# Patient Record
Sex: FEMALE | Race: WHITE | NOT HISPANIC OR LATINO | URBAN - METROPOLITAN AREA
[De-identification: names, ages, dates, MRNs, and addresses within clinical notes are randomized per-mention and may not be internally consistent; named-entity substitution may affect disease eponyms.]

---

## 2017-12-06 PROBLEM — 34000006 CROHN'S DISEASE: Status: ACTIVE | Noted: 2017-12-06

## 2017-12-06 PROBLEM — 13645005 CHRONIC OBSTRUCTIVE PULMONARY DISEASE: Status: ACTIVE | Noted: 2017-12-06

## 2017-12-06 PROBLEM — 13644009 HYPERCHOLESTEROLEMIA: Status: ACTIVE | Noted: 2017-12-06

## 2018-05-04 PROBLEM — 428283002 HISTORY OF POLYP OF COLON: Status: ACTIVE | Noted: 2018-05-04

## 2018-05-04 PROBLEM — 70153002 HEMORRHOIDS: Status: ACTIVE | Noted: 2018-05-04

## 2019-08-02 PROBLEM — 64226004 COLITIS: Status: ACTIVE | Noted: 2019-08-02

## 2019-08-02 PROBLEM — 10743008 IRRITABLE BOWEL SYNDROME: Status: ACTIVE | Noted: 2019-08-02

## 2019-11-08 PROBLEM — 197321007 FATTY LIVER: Status: ACTIVE | Noted: 2019-11-08

## 2019-11-08 PROBLEM — 91175000 SEIZURE: Status: ACTIVE | Noted: 2019-11-08

## 2021-03-16 ENCOUNTER — OFFICE VISIT (OUTPATIENT)
Dept: URBAN - METROPOLITAN AREA TELEHEALTH 2 | Facility: TELEHEALTH | Age: 76
End: 2021-03-16

## 2021-03-16 PROBLEM — 3723001 ARTHRITIS: Status: ACTIVE | Noted: 2021-03-16

## 2021-03-16 PROBLEM — 235595009 GASTROESOPHAGEAL REFLUX DISEASE: Status: ACTIVE | Noted: 2021-03-16

## 2021-03-16 PROBLEM — 84089009 HIATAL HERNIA: Status: ACTIVE | Noted: 2021-03-16

## 2021-03-16 PROBLEM — 64859006 OSTEOPOROSIS: Status: ACTIVE | Noted: 2021-03-16

## 2021-03-16 PROBLEM — 398050005 DIVERTICULAR DISEASE OF COLON: Status: ACTIVE | Noted: 2021-03-16

## 2021-08-03 ENCOUNTER — OFFICE VISIT (OUTPATIENT)
Dept: URBAN - METROPOLITAN AREA CLINIC 13 | Facility: CLINIC | Age: 76
End: 2021-08-03

## 2021-08-03 ENCOUNTER — OFFICE VISIT (OUTPATIENT)
Dept: URBAN - METROPOLITAN AREA TELEHEALTH 2 | Facility: TELEHEALTH | Age: 76
End: 2021-08-03

## 2021-08-28 ENCOUNTER — TELEPHONE ENCOUNTER (OUTPATIENT)
Dept: URBAN - METROPOLITAN AREA CLINIC 13 | Facility: CLINIC | Age: 76
End: 2021-08-28

## 2021-08-28 RX ORDER — FENOFIBRATE 134 MG/1
CAPSULE ORAL
OUTPATIENT
End: 2019-11-08

## 2021-08-28 RX ORDER — OMEPRAZOLE 10 MG/1
CAPSULE, DELAYED RELEASE ORAL
OUTPATIENT
Start: 2017-12-07 | End: 2021-02-15

## 2021-08-28 RX ORDER — PANTOPRAZOLE SODIUM 40 MG/1
TABLET, DELAYED RELEASE ORAL
OUTPATIENT
Start: 2020-04-13 | End: 2021-02-15

## 2021-08-28 RX ORDER — ESOMEPRAZOLE MAGNESIUM 20 MG/1
FOR SUSPENSION ORAL
OUTPATIENT
Start: 2020-03-31 | End: 2021-06-09

## 2021-08-28 RX ORDER — OMEPRAZOLE 10 MG/1
CAPSULE, DELAYED RELEASE ORAL
OUTPATIENT
End: 2018-05-04

## 2021-08-28 RX ORDER — MERCAPTOPURINE 50 MG/1
TABLET ORAL
OUTPATIENT
End: 2018-05-04

## 2021-08-29 ENCOUNTER — TELEPHONE ENCOUNTER (OUTPATIENT)
Dept: URBAN - METROPOLITAN AREA CLINIC 13 | Facility: CLINIC | Age: 76
End: 2021-08-29

## 2021-08-29 RX ORDER — OMEPRAZOLE 10 MG/1
CAPSULE, DELAYED RELEASE ORAL
Status: ACTIVE | COMMUNITY

## 2021-08-29 RX ORDER — DICYCLOMINE HYDROCHLORIDE 10 MG/1
CAPSULE ORAL
Status: ACTIVE | COMMUNITY
Start: 2020-01-29

## 2021-08-29 RX ORDER — MERCAPTOPURINE 50 MG/1
TABLET ORAL
Status: ACTIVE | COMMUNITY
Start: 2018-02-07

## 2021-08-29 RX ORDER — CYANOCOBALAMIN 1000 UG/ML
INJECTION INTRAMUSCULAR; SUBCUTANEOUS
Status: ACTIVE | COMMUNITY

## 2021-08-29 RX ORDER — PRIMIDONE 50 MG/1
TABLET ORAL
Status: ACTIVE | COMMUNITY

## 2021-08-29 RX ORDER — ESOMEPRAZOLE MAGNESIUM 20 MG/1
FOR SUSPENSION ORAL
Status: ACTIVE | COMMUNITY

## 2022-01-11 ENCOUNTER — ERX REFILL RESPONSE (OUTPATIENT)
Dept: URBAN - METROPOLITAN AREA CLINIC 44 | Facility: CLINIC | Age: 77
End: 2022-01-11

## 2022-01-11 RX ORDER — MERCAPTOPURINE 50 MG/1
TABLET ORAL
OUTPATIENT

## 2022-01-11 RX ORDER — MERCAPTOPURINE 50 MG/1
TAKE 1 TABLET BY MOUTH EVERY DAY TABLET ORAL
Qty: 90 TABLET | Refills: 4 | OUTPATIENT

## 2022-02-09 ENCOUNTER — TELEPHONE ENCOUNTER (OUTPATIENT)
Dept: URBAN - METROPOLITAN AREA CLINIC 46 | Facility: CLINIC | Age: 77
End: 2022-02-09

## 2022-02-09 ENCOUNTER — OFFICE VISIT (OUTPATIENT)
Dept: URBAN - METROPOLITAN AREA CLINIC 48 | Facility: CLINIC | Age: 77
End: 2022-02-09

## 2022-02-09 RX ORDER — ADALIMUMAB 40MG/0.8ML
0.8 ML KIT SUBCUTANEOUS EVERY 2 WEEKS
Qty: 2 PEN NEEDLE | Refills: 11 | OUTPATIENT
Start: 2022-02-09 | End: 2023-02-04

## 2022-03-22 ENCOUNTER — OFFICE VISIT (OUTPATIENT)
Dept: URBAN - METROPOLITAN AREA CLINIC 44 | Facility: CLINIC | Age: 77
End: 2022-03-22

## 2022-03-22 RX ORDER — OMEPRAZOLE 10 MG/1
CAPSULE, DELAYED RELEASE ORAL
Status: DISCONTINUED | COMMUNITY

## 2022-03-22 RX ORDER — METFORMIN HYDROCHLORIDE 750 MG/1
1 TABLET WITH EVENING MEAL TABLET, EXTENDED RELEASE ORAL ONCE A DAY
Status: ACTIVE | COMMUNITY

## 2022-03-22 RX ORDER — ADALIMUMAB 40MG/0.4ML
1 SUBQ  Q 2 WEEKS KIT SUBCUTANEOUS
Status: ACTIVE | COMMUNITY

## 2022-03-22 RX ORDER — ADALIMUMAB 40MG/0.8ML
0.8 ML KIT SUBCUTANEOUS EVERY 2 WEEKS
Qty: 2 PEN NEEDLE | Refills: 11 | Status: DISCONTINUED | COMMUNITY
Start: 2022-02-09 | End: 2023-02-04

## 2022-03-22 RX ORDER — OMEPRAZOLE 20 MG/1
1 CAPSULE 30 MINUTES BEFORE MORNING MEAL CAPSULE, DELAYED RELEASE ORAL ONCE A DAY
Status: ACTIVE | COMMUNITY

## 2022-03-22 RX ORDER — CYANOCOBALAMIN 1000 UG/ML
INJECTION INTRAMUSCULAR; SUBCUTANEOUS
Status: DISCONTINUED | COMMUNITY

## 2022-03-22 RX ORDER — PRIMIDONE 50 MG/1
1 TABLET TABLET ORAL ONCE A DAY
Status: ACTIVE | COMMUNITY

## 2022-03-22 RX ORDER — MERCAPTOPURINE 50 MG/1
TAKE 1 TABLET BY MOUTH EVERY DAY TABLET ORAL
Qty: 90 TABLET | Refills: 4 | Status: DISCONTINUED | COMMUNITY

## 2022-03-22 RX ORDER — ESOMEPRAZOLE MAGNESIUM 20 MG/1
1 CAPSULE CAPSULE, DELAYED RELEASE ORAL ONCE A DAY
Status: ACTIVE | COMMUNITY

## 2022-03-22 RX ORDER — GLIMEPIRIDE 1 MG/1
1 TABLET WITH BREAKFAST OR THE FIRST MAIN MEAL OF THE DAY TABLET ORAL ONCE A DAY
Status: ACTIVE | COMMUNITY

## 2022-03-22 RX ORDER — DICYCLOMINE HYDROCHLORIDE 10 MG/1
CAPSULE ORAL
Status: DISCONTINUED | COMMUNITY
Start: 2020-01-29

## 2022-03-22 RX ORDER — PRIMIDONE 50 MG/1
TABLET ORAL
Status: DISCONTINUED | COMMUNITY

## 2022-03-22 RX ORDER — ESOMEPRAZOLE MAGNESIUM 20 MG/1
FOR SUSPENSION ORAL
Status: DISCONTINUED | COMMUNITY

## 2022-04-04 ENCOUNTER — ERX REFILL RESPONSE (OUTPATIENT)
Dept: URBAN - METROPOLITAN AREA CLINIC 44 | Facility: CLINIC | Age: 77
End: 2022-04-04

## 2022-04-04 ENCOUNTER — OFFICE VISIT (OUTPATIENT)
Dept: URBAN - METROPOLITAN AREA CLINIC 44 | Facility: CLINIC | Age: 77
End: 2022-04-04

## 2022-04-04 RX ORDER — ESOMEPRAZOLE MAGNESIUM 20 MG/1
TAKE 1 CAPSULE BY MOUTH EVERY DAY AS DIRECTED CAPSULE, DELAYED RELEASE ORAL
Qty: 90 CAPSULE | Refills: 4 | OUTPATIENT

## 2022-05-03 ENCOUNTER — OFFICE VISIT (OUTPATIENT)
Dept: URBAN - METROPOLITAN AREA CLINIC 44 | Facility: CLINIC | Age: 77
End: 2022-05-03
Payer: COMMERCIAL

## 2022-05-03 VITALS
HEIGHT: 63 IN | TEMPERATURE: 97.7 F | HEART RATE: 64 BPM | SYSTOLIC BLOOD PRESSURE: 132 MMHG | WEIGHT: 158 LBS | DIASTOLIC BLOOD PRESSURE: 64 MMHG | BODY MASS INDEX: 28 KG/M2

## 2022-05-03 DIAGNOSIS — D50.8 OTHER IRON DEFICIENCY ANEMIA: ICD-10-CM

## 2022-05-03 DIAGNOSIS — K21.9 GASTROESOPHAGEAL REFLUX DISEASE WITHOUT ESOPHAGITIS: ICD-10-CM

## 2022-05-03 DIAGNOSIS — K51.80 OTHER ULCERATIVE COLITIS: ICD-10-CM

## 2022-05-03 PROBLEM — 266435005: Status: ACTIVE | Noted: 2022-05-03

## 2022-05-03 PROCEDURE — 99214 OFFICE O/P EST MOD 30 MIN: CPT | Performed by: INTERNAL MEDICINE

## 2022-05-03 RX ORDER — ADALIMUMAB 40MG/0.4ML
1 SUBQ  Q 2 WEEKS KIT SUBCUTANEOUS
OUTPATIENT

## 2022-05-03 RX ORDER — ADALIMUMAB 40MG/0.4ML
1 SUBQ  Q 2 WEEKS KIT SUBCUTANEOUS
Status: ACTIVE | COMMUNITY

## 2022-05-03 RX ORDER — AZATHIOPRINE 50 MG
AS DIRECTED TABLET ORAL ONCE A DAY
OUTPATIENT

## 2022-05-03 RX ORDER — PRIMIDONE 50 MG/1
1 TABLET TABLET ORAL ONCE A DAY
Status: ACTIVE | COMMUNITY

## 2022-05-03 RX ORDER — ESOMEPRAZOLE MAGNESIUM 20 MG/1
TAKE 1 CAPSULE BY MOUTH EVERY DAY AS DIRECTED CAPSULE, DELAYED RELEASE ORAL
OUTPATIENT

## 2022-05-03 RX ORDER — ESOMEPRAZOLE MAGNESIUM 20 MG/1
TAKE 1 CAPSULE BY MOUTH EVERY DAY AS DIRECTED CAPSULE, DELAYED RELEASE ORAL
Qty: 90 CAPSULE | Refills: 4 | Status: ACTIVE | COMMUNITY

## 2022-05-03 RX ORDER — GLIMEPIRIDE 1 MG/1
1 TABLET WITH BREAKFAST OR THE FIRST MAIN MEAL OF THE DAY TABLET ORAL ONCE A DAY
Status: ACTIVE | COMMUNITY

## 2022-05-03 RX ORDER — DAPAGLIFLOZIN 5 MG/1
1 TABLET TABLET, FILM COATED ORAL ONCE A DAY
Status: ACTIVE | COMMUNITY

## 2022-05-03 RX ORDER — AZATHIOPRINE 50 MG
AS DIRECTED TABLET ORAL ONCE A DAY
Status: ACTIVE | COMMUNITY

## 2022-05-03 NOTE — HPI-TODAY'S VISIT:
Pt with a hx of moderate to severe Ulcerative colitis started on Remicade in 2014 and did not achieve full clinical remission despite adding immunomodulators. Was on 5mg/kg every 8 weeks and changed to every 6 weeks in mid 2014 due to persistent diarrhea. Remicade changed to Humira 2015 after restaging colonoscopy revealed persistent colitis and adequate serum levels of drug. 6MP restarted with Apriso and still with symptoms; re-staging colonoscopy 10/2015 revealed active left sided colitis and microscpic disease in right colon. Pt treated with prednisone and later achieved clinical remission. Has needed IV irion in the past.  Last seen 3/2021 and stable on Humira 40mg q 2 weeks; 50mg of 6MP, Apriso 0.75 gm per day. Anser testing and no Ab's and CBC and CMP wnl. Pt now presents for a follow up. Missed her last visit in 9/2021. Was hospitalized 1/2022 for acute cholecystitis and CMP and CBC wnl at that time; repeat CT 3 weeks later and post surgical changes and no colitis.  Now states doing well and no loose stools or bleeding. Still on Humira 40mg q2 weeks and 50mg of 6MP; off Apriso

## 2022-05-27 ENCOUNTER — OFFICE VISIT (OUTPATIENT)
Dept: URBAN - METROPOLITAN AREA SURGERY CENTER 27 | Facility: SURGERY CENTER | Age: 77
End: 2022-05-27

## 2022-06-13 PROBLEM — 442159003 CHRONIC ULCERATIVE PANCOLITIS: Status: ACTIVE | Noted: 2022-05-13

## 2022-06-30 ENCOUNTER — OFFICE VISIT (OUTPATIENT)
Dept: URBAN - METROPOLITAN AREA SURGERY CENTER 27 | Facility: SURGERY CENTER | Age: 77
End: 2022-06-30
Payer: COMMERCIAL

## 2022-06-30 ENCOUNTER — CLAIMS CREATED FROM THE CLAIM WINDOW (OUTPATIENT)
Dept: URBAN - METROPOLITAN AREA CLINIC 4 | Facility: CLINIC | Age: 77
End: 2022-06-30
Payer: COMMERCIAL

## 2022-06-30 DIAGNOSIS — K57.30 ACQUIRED DIVERTICULOSIS OF COLON: ICD-10-CM

## 2022-06-30 DIAGNOSIS — K51.80 OTHER ULCERATIVE COLITIS WITHOUT COMPLICATIONS: ICD-10-CM

## 2022-06-30 DIAGNOSIS — K51.80 CHRONIC PANCOLONIC ULCERATIVE COLITIS: ICD-10-CM

## 2022-06-30 DIAGNOSIS — K64.1 BLEEDING GRADE II HEMORRHOIDS: ICD-10-CM

## 2022-06-30 DIAGNOSIS — K63.89 OTHER SPECIFIED DISEASES OF INTESTINE: ICD-10-CM

## 2022-06-30 DIAGNOSIS — K63.89 BACTERIAL OVERGROWTH SYNDROME: ICD-10-CM

## 2022-06-30 PROCEDURE — 45380 COLONOSCOPY AND BIOPSY: CPT | Performed by: INTERNAL MEDICINE

## 2022-06-30 PROCEDURE — 88305 TISSUE EXAM BY PATHOLOGIST: CPT | Performed by: PATHOLOGY

## 2022-06-30 PROCEDURE — G8907 PT DOC NO EVENTS ON DISCHARG: HCPCS | Performed by: INTERNAL MEDICINE

## 2022-06-30 RX ORDER — GLIMEPIRIDE 1 MG/1
1 TABLET WITH BREAKFAST OR THE FIRST MAIN MEAL OF THE DAY TABLET ORAL ONCE A DAY
Status: ACTIVE | COMMUNITY

## 2022-06-30 RX ORDER — DAPAGLIFLOZIN 5 MG/1
1 TABLET TABLET, FILM COATED ORAL ONCE A DAY
Status: ACTIVE | COMMUNITY

## 2022-06-30 RX ORDER — ESOMEPRAZOLE MAGNESIUM 20 MG/1
TAKE 1 CAPSULE BY MOUTH EVERY DAY AS DIRECTED CAPSULE, DELAYED RELEASE ORAL
Status: ACTIVE | COMMUNITY

## 2022-06-30 RX ORDER — ADALIMUMAB 40MG/0.4ML
1 SUBQ  Q 2 WEEKS KIT SUBCUTANEOUS
Status: ACTIVE | COMMUNITY

## 2022-06-30 RX ORDER — AZATHIOPRINE 50 MG
AS DIRECTED TABLET ORAL ONCE A DAY
Status: ACTIVE | COMMUNITY

## 2022-06-30 RX ORDER — PRIMIDONE 50 MG/1
1 TABLET TABLET ORAL ONCE A DAY
Status: ACTIVE | COMMUNITY

## 2022-08-02 ENCOUNTER — ERX REFILL RESPONSE (OUTPATIENT)
Dept: URBAN - METROPOLITAN AREA CLINIC 44 | Facility: CLINIC | Age: 77
End: 2022-08-02

## 2022-08-02 RX ORDER — ADALIMUMAB 40MG/0.4ML
INJECT 40MG UNDER THE SKIN EVERY 2 WEEKS KIT SUBCUTANEOUS
Qty: 2 EACH | Refills: 10 | OUTPATIENT

## 2022-08-02 RX ORDER — ADALIMUMAB 40MG/0.4ML
1 SUBQ  Q 2 WEEKS KIT SUBCUTANEOUS
OUTPATIENT

## 2023-03-13 ENCOUNTER — P2P PATIENT RECORD (OUTPATIENT)
Age: 78
End: 2023-03-13

## 2023-03-21 ENCOUNTER — OFFICE VISIT (OUTPATIENT)
Dept: URBAN - METROPOLITAN AREA CLINIC 48 | Facility: CLINIC | Age: 78
End: 2023-03-21
Payer: COMMERCIAL

## 2023-03-21 VITALS — BODY MASS INDEX: 30.83 KG/M2 | WEIGHT: 174 LBS | HEIGHT: 63 IN

## 2023-03-21 DIAGNOSIS — K21.9 GASTROESOPHAGEAL REFLUX DISEASE WITHOUT ESOPHAGITIS: ICD-10-CM

## 2023-03-21 DIAGNOSIS — K51.00 ULCERATIVE PAN COLITIS: ICD-10-CM

## 2023-03-21 DIAGNOSIS — K57.30 DVRTCLOS OF LG INT W/O PERFORATION OR ABSCESS W/O BLEEDING: ICD-10-CM

## 2023-03-21 DIAGNOSIS — D50.0 ANEMIA, IRON DEFICIENCY FROM CHRONIC BLOOD LOSS: ICD-10-CM

## 2023-03-21 PROBLEM — 398050005 DIVERTICULAR DISEASE OF COLON: Status: ACTIVE | Noted: 2023-03-21

## 2023-03-21 PROBLEM — 413533008 ANEMIA DUE TO CHRONIC BLOOD LOSS: Status: ACTIVE | Noted: 2023-03-21

## 2023-03-21 PROCEDURE — 99213 OFFICE O/P EST LOW 20 MIN: CPT | Performed by: NURSE PRACTITIONER

## 2023-03-21 RX ORDER — ADALIMUMAB 40MG/0.4ML
INJECT 40MG UNDER THE SKIN EVERY 2 WEEKS KIT SUBCUTANEOUS
Qty: 2 EACH | Refills: 10 | Status: ACTIVE | COMMUNITY

## 2023-03-21 RX ORDER — MERCAPTOPURINE 50 MG/1
AS DIRECTED TABLET ORAL ONCE A DAY
Status: ACTIVE | COMMUNITY

## 2023-03-21 RX ORDER — ESOMEPRAZOLE MAGNESIUM 20 MG/1
TAKE 1 CAPSULE BY MOUTH EVERY DAY AS DIRECTED CAPSULE, DELAYED RELEASE ORAL
Status: ACTIVE | COMMUNITY

## 2023-03-21 NOTE — HPI-TODAY'S VISIT:
Pt with a hx of moderate to severe Ulcerative colitis started on Remicade in 2014 and did not achieve full clinical remission despite adding immunomodulators. Was on 5mg/kg every 8 weeks and changed to every 6 weeks in mid 2014 due to persistent diarrhea. Remicade changed to Humira 2015 after restaging colonoscopy revealed persistent colitis and adequate serum levels of drug. 6MP restarted with Apriso and still with symptoms; re-staging colonoscopy 10/2015 revealed active left sided colitis and microscpic disease in right colon. Pt treated with prednisone and later achieved clinical remission. Has needed IV irion in the past.   Was hospitalized 1/2022 for acute cholecystitis and CMP and CBC wnl at that time; repeat CT 3 weeks later and post surgical changes and no colitis.  Colon 6/2022 showed diverticulosis, no active colitis but scars from previous inflammation, internal hemorrhoids and a normal TI. Path with left colon and rectal patchy mild colitis. She has been stable on Humira 40mg q 2 weeks; 50mg of 6MP. Anser testing and no Ab's and CBC and CMP wnl. Denies abdominal pain and bowel movements are 1-2 times daily of formed, non-bloody stool. GERD is well controlled on Esomepraozle.

## 2023-05-09 ENCOUNTER — TELEPHONE ENCOUNTER (OUTPATIENT)
Dept: URBAN - METROPOLITAN AREA CLINIC 46 | Facility: CLINIC | Age: 78
End: 2023-05-09

## 2023-05-09 RX ORDER — MERCAPTOPURINE 50 MG/1
1 CAPSULE TABLET ORAL ONCE A DAY
Qty: 30 CAPSULE | Refills: 5

## 2023-05-09 RX ORDER — ESOMEPRAZOLE MAGNESIUM 20 MG/1
TAKE 1 CAPSULE BY MOUTH EVERY DAY AS DIRECTED CAPSULE, DELAYED RELEASE ORAL ONCE A DAY
Qty: 30 | Refills: 5

## 2023-06-29 ENCOUNTER — P2P PATIENT RECORD (OUTPATIENT)
Age: 78
End: 2023-06-29

## 2023-07-14 ENCOUNTER — ERX REFILL RESPONSE (OUTPATIENT)
Dept: URBAN - METROPOLITAN AREA CLINIC 44 | Facility: CLINIC | Age: 78
End: 2023-07-14

## 2023-07-14 RX ORDER — ADALIMUMAB 40MG/0.4ML
INJECT 40MG UNDER THE SKIN EVERY 2 WEEKS KIT SUBCUTANEOUS
Qty: 2 | Refills: 8 | OUTPATIENT

## 2023-07-14 RX ORDER — ADALIMUMAB 40MG/0.4ML
INJECT 40MG UNDER THE SKIN EVERY 2 WEEKS KIT SUBCUTANEOUS
Qty: 2 | Refills: 9 | OUTPATIENT

## 2023-09-07 ENCOUNTER — ERX REFILL RESPONSE (OUTPATIENT)
Dept: URBAN - METROPOLITAN AREA CLINIC 46 | Facility: CLINIC | Age: 78
End: 2023-09-07

## 2023-09-07 RX ORDER — ESOMEPRAZOLE MAGNESIUM 20 MG/1
TAKE 1 CAPSULE BY MOUTH DAILY CAPSULE, DELAYED RELEASE ORAL
Qty: 60 CAPSULE | Refills: 5 | OUTPATIENT

## 2023-09-07 RX ORDER — ESOMEPRAZOLE MAGNESIUM 20 MG/1
TAKE 1 CAPSULE BY MOUTH EVERY DAY AS DIRECTED CAPSULE, DELAYED RELEASE ORAL ONCE A DAY
Qty: 30 | Refills: 5 | OUTPATIENT

## 2024-05-02 ENCOUNTER — OFFICE VISIT (OUTPATIENT)
Dept: URBAN - METROPOLITAN AREA CLINIC 25 | Facility: CLINIC | Age: 79
End: 2024-05-02

## 2024-05-07 ENCOUNTER — ERX REFILL RESPONSE (OUTPATIENT)
Dept: URBAN - METROPOLITAN AREA CLINIC 44 | Facility: CLINIC | Age: 79
End: 2024-05-07

## 2024-05-07 RX ORDER — ADALIMUMAB 40MG/0.4ML
INJECT 40MG UNDER THE SKIN EVERY 2 WEEKS KIT SUBCUTANEOUS
Qty: 2 | Refills: 8 | OUTPATIENT

## 2024-05-07 RX ORDER — ADALIMUMAB 40MG/0.4ML
INJECT 1 PEN UNDER THE SKIN EVERY 14 DAYS KIT SUBCUTANEOUS
Qty: 2 | Refills: 0 | OUTPATIENT

## 2024-06-05 ENCOUNTER — ERX REFILL RESPONSE (OUTPATIENT)
Dept: URBAN - METROPOLITAN AREA CLINIC 44 | Facility: CLINIC | Age: 79
End: 2024-06-05

## 2024-06-05 RX ORDER — ADALIMUMAB 40MG/0.4ML
INJECT 1 PEN UNDER THE SKIN EVERY 14 DAYS KIT SUBCUTANEOUS
Qty: 2 | Refills: 0 | OUTPATIENT

## 2024-06-21 ENCOUNTER — TELEPHONE ENCOUNTER (OUTPATIENT)
Dept: URBAN - METROPOLITAN AREA CLINIC 46 | Facility: CLINIC | Age: 79
End: 2024-06-21

## 2024-06-21 RX ORDER — ESOMEPRAZOLE MAGNESIUM 20 MG/1
1 CAPSULE CAPSULE, DELAYED RELEASE ORAL ONCE A DAY
Qty: 30 | Refills: 2 | OUTPATIENT

## 2024-07-16 ENCOUNTER — DASHBOARD ENCOUNTERS (OUTPATIENT)
Age: 79
End: 2024-07-16

## 2024-07-16 ENCOUNTER — ERX REFILL RESPONSE (OUTPATIENT)
Dept: URBAN - METROPOLITAN AREA CLINIC 44 | Facility: CLINIC | Age: 79
End: 2024-07-16

## 2024-07-16 RX ORDER — ADALIMUMAB 40MG/0.4ML
INJECT 1 PEN UNDER THE SKIN EVERY 14 DAYS KIT SUBCUTANEOUS
Qty: 2 | Refills: 0 | OUTPATIENT

## 2024-07-18 ENCOUNTER — OFFICE VISIT (OUTPATIENT)
Dept: URBAN - METROPOLITAN AREA CLINIC 48 | Facility: CLINIC | Age: 79
End: 2024-07-18
Payer: COMMERCIAL

## 2024-07-18 VITALS
TEMPERATURE: 97.5 F | DIASTOLIC BLOOD PRESSURE: 76 MMHG | HEART RATE: 77 BPM | SYSTOLIC BLOOD PRESSURE: 113 MMHG | BODY MASS INDEX: 25.55 KG/M2 | WEIGHT: 144.2 LBS | HEIGHT: 63 IN | OXYGEN SATURATION: 95 %

## 2024-07-18 DIAGNOSIS — K51.00 ULCERATIVE PAN COLITIS: ICD-10-CM

## 2024-07-18 DIAGNOSIS — K21.9 GASTROESOPHAGEAL REFLUX DISEASE WITHOUT ESOPHAGITIS: ICD-10-CM

## 2024-07-18 DIAGNOSIS — R63.4 WEIGHT LOSS: ICD-10-CM

## 2024-07-18 PROCEDURE — 99214 OFFICE O/P EST MOD 30 MIN: CPT | Performed by: PHYSICIAN ASSISTANT

## 2024-07-18 RX ORDER — BUDESONIDE 3 MG/1
3 CAPSULES CAPSULE ORAL ONCE A DAY
Qty: 90 CAPSULE | Refills: 3 | Status: ON HOLD | COMMUNITY
Start: 2024-04-24

## 2024-07-18 RX ORDER — ESOMEPRAZOLE MAGNESIUM 20 MG/1
1 CAPSULE CAPSULE, DELAYED RELEASE ORAL ONCE A DAY
OUTPATIENT

## 2024-07-18 RX ORDER — MERCAPTOPURINE 50 MG/1
AS DIRECTED TABLET ORAL ONCE A DAY
Qty: 90 | Refills: 3 | OUTPATIENT
Start: 2024-07-18

## 2024-07-18 RX ORDER — ADALIMUMAB 40MG/0.4ML
INJECT 1 PEN UNDER THE SKIN EVERY 14 DAYS KIT SUBCUTANEOUS
Qty: 2 | Refills: 0 | Status: ACTIVE | COMMUNITY

## 2024-07-18 RX ORDER — ADALIMUMAB 40MG/0.4ML
INJECT 1 PEN UNDER THE SKIN EVERY 14 DAYS KIT SUBCUTANEOUS
OUTPATIENT

## 2024-07-18 RX ORDER — ESOMEPRAZOLE MAGNESIUM 20 MG/1
1 CAPSULE CAPSULE, DELAYED RELEASE ORAL ONCE A DAY
Qty: 30 | Refills: 2 | Status: ACTIVE | COMMUNITY

## 2024-07-18 NOTE — HPI-TODAY'S VISIT:
Pt with a hx of moderate to severe Ulcerative colitis started on Remicade in 2014 and did not achieve full clinical remission despite adding immunomodulators. Was on 5mg/kg every 8 weeks and changed to every 6 weeks in mid 2014 due to persistent diarrhea. Remicade changed to Humira 2015 after restaging colonoscopy revealed persistent colitis and adequate serum levels of drug. 6MP restarted with Apriso and still with symptoms; re-staging colonoscopy 10/2015 revealed active left sided colitis and microscpic disease in right colon. Pt treated with prednisone and later achieved clinical remission. Has needed IV iron in the past.  Was hospitalized 1/2022 for acute cholecystitis and CMP and CBC wnl at that time; repeat CT 3 weeks later and post surgical changes and no colitis.  Colon 6/2022 showed diverticulosis, grossly normal colonic mucosa, and path showed patchy mild chronic inactive colitis consistent with quiescent UC, internal hemorrhoids and a normal TI. When last seen 3/2023, she had been stable on Humira 40mg q 2 weeks; 50mg of 6MP. Anser testing and no Ab's and CBC and CMP wnl. Denied abdominal pain and bowel movements are 1-2 times daily of formed, non-bloody stool. GERD was well controlled on Esomepraozle. She called 4/2024 c/o colitis flare and Budesonide and Hyoscyamine were sent in.   7/18/24 for follow up: Labs 2/2824 showed an unremarkable CMP aside from elevated glucose and minimal ALT elevation of 40. CBC with no anemia (H&H slightly elevated at 16.3/47.9). She reports more recent blood work done by Dr. Vickers. She continues to take Humira, but ran out of 6MP over 6 months ago, and can tell the difference. She was having frequent stools (5-6/day) when she had her flare, but now down to 2 formed stools/day since completing 2 months of Budesonide. She continues to have cramping lower abdominal pain which is relieved with Hyoscyamine. She has seen trace blood per rectum which she believes is from  her hemorrhoids; no bloody stool. She has lost 30 pounds since her last visit which she feels is due to the UC since off of 6MP.

## 2024-07-21 ENCOUNTER — LAB OUTSIDE AN ENCOUNTER (OUTPATIENT)
Dept: URBAN - METROPOLITAN AREA CLINIC 46 | Facility: CLINIC | Age: 79
End: 2024-07-21

## 2024-07-23 ENCOUNTER — TELEPHONE ENCOUNTER (OUTPATIENT)
Dept: URBAN - METROPOLITAN AREA CLINIC 46 | Facility: CLINIC | Age: 79
End: 2024-07-23

## 2024-07-23 RX ORDER — ESOMEPRAZOLE MAGNESIUM 20 MG/1: 1 CAPSULE CAPSULE, DELAYED RELEASE ORAL ONCE A DAY

## 2024-07-23 RX ORDER — ESOMEPRAZOLE MAGNESIUM 20 MG/1
1 CAPSULE CAPSULE, DELAYED RELEASE PELLETS ORAL ONCE A DAY
Qty: 30 | OUTPATIENT
Start: 2024-07-23

## 2024-07-25 ENCOUNTER — TELEPHONE ENCOUNTER (OUTPATIENT)
Dept: URBAN - METROPOLITAN AREA CLINIC 46 | Facility: CLINIC | Age: 79
End: 2024-07-25

## 2024-07-25 LAB — CALPROTECTIN, STOOL - QDX: (no result)

## 2024-07-31 ENCOUNTER — OFFICE VISIT (OUTPATIENT)
Dept: URBAN - METROPOLITAN AREA CLINIC 48 | Facility: CLINIC | Age: 79
End: 2024-07-31

## 2024-08-06 ENCOUNTER — TELEPHONE ENCOUNTER (OUTPATIENT)
Dept: URBAN - METROPOLITAN AREA CLINIC 6 | Facility: CLINIC | Age: 79
End: 2024-08-06

## 2024-08-22 ENCOUNTER — TELEPHONE ENCOUNTER (OUTPATIENT)
Dept: URBAN - METROPOLITAN AREA CLINIC 44 | Facility: CLINIC | Age: 79
End: 2024-08-22

## 2024-09-11 ENCOUNTER — OFFICE VISIT (OUTPATIENT)
Dept: URBAN - METROPOLITAN AREA CLINIC 48 | Facility: CLINIC | Age: 79
End: 2024-09-11

## 2024-09-11 RX ORDER — MERCAPTOPURINE 50 MG/1
AS DIRECTED TABLET ORAL ONCE A DAY
Qty: 90 | Refills: 3 | Status: ACTIVE | COMMUNITY
Start: 2024-07-18

## 2024-09-11 RX ORDER — ESOMEPRAZOLE MAGNESIUM 20 MG/1
1 CAPSULE CAPSULE, DELAYED RELEASE PELLETS ORAL ONCE A DAY
Qty: 30 | Status: ACTIVE | COMMUNITY
Start: 2024-07-23

## 2024-09-11 RX ORDER — ADALIMUMAB 40MG/0.4ML
AS DIRECTED KIT SUBCUTANEOUS
Status: ACTIVE | COMMUNITY

## 2024-09-11 RX ORDER — BUDESONIDE 3 MG/1
3 CAPSULES CAPSULE ORAL ONCE A DAY
Qty: 90 CAPSULE | Refills: 3 | Status: ACTIVE | COMMUNITY
Start: 2024-04-24

## 2024-09-17 ENCOUNTER — ERX REFILL RESPONSE (OUTPATIENT)
Dept: URBAN - METROPOLITAN AREA CLINIC 44 | Facility: CLINIC | Age: 79
End: 2024-09-17

## 2024-09-17 RX ORDER — ADALIMUMAB 40MG/0.4ML
INJECT 40 MG UNDER SKIN KIT SUBCUTANEOUS
Qty: 2 | Refills: 6 | OUTPATIENT

## 2024-09-17 RX ORDER — ADALIMUMAB 40MG/0.4ML
AS DIRECTED KIT SUBCUTANEOUS
OUTPATIENT

## 2024-11-20 ENCOUNTER — OFFICE VISIT (OUTPATIENT)
Dept: URBAN - METROPOLITAN AREA CLINIC 48 | Facility: CLINIC | Age: 79
End: 2024-11-20

## 2024-12-17 ENCOUNTER — OFFICE VISIT (OUTPATIENT)
Dept: URBAN - METROPOLITAN AREA CLINIC 44 | Facility: CLINIC | Age: 79
End: 2024-12-17
Payer: COMMERCIAL

## 2024-12-17 VITALS
BODY MASS INDEX: 25.2 KG/M2 | SYSTOLIC BLOOD PRESSURE: 94 MMHG | TEMPERATURE: 97.7 F | WEIGHT: 142.2 LBS | HEART RATE: 86 BPM | HEIGHT: 63 IN | DIASTOLIC BLOOD PRESSURE: 67 MMHG

## 2024-12-17 DIAGNOSIS — K51.00 ULCERATIVE PAN COLITIS: ICD-10-CM

## 2024-12-17 DIAGNOSIS — K57.30 DVRTCLOS OF LG INT W/O PERFORATION OR ABSCESS W/O BLEEDING: ICD-10-CM

## 2024-12-17 DIAGNOSIS — K70.0 FATTY LIVER: ICD-10-CM

## 2024-12-17 DIAGNOSIS — Z79.899 HIGH RISK MEDICATION USE: ICD-10-CM

## 2024-12-17 PROCEDURE — 99214 OFFICE O/P EST MOD 30 MIN: CPT | Performed by: INTERNAL MEDICINE

## 2024-12-17 RX ORDER — ROSUVASTATIN CALCIUM 10 MG/1
1 TABLET TABLET, COATED ORAL ONCE A DAY
Status: ACTIVE | COMMUNITY

## 2024-12-17 RX ORDER — MERCAPTOPURINE 50 MG/1
AS DIRECTED TABLET ORAL ONCE A DAY
Qty: 90 | Refills: 3 | Status: ACTIVE | COMMUNITY
Start: 2024-07-18

## 2024-12-17 RX ORDER — ESOMEPRAZOLE MAGNESIUM 20 MG/1
1 CAPSULE CAPSULE, DELAYED RELEASE ORAL ONCE A DAY
Status: ACTIVE | COMMUNITY

## 2024-12-17 RX ORDER — SITAGLIPTIN 100 MG/1
1 TABLET TABLET, FILM COATED ORAL ONCE A DAY
Status: ACTIVE | COMMUNITY

## 2024-12-17 RX ORDER — EMPAGLIFLOZIN 25 MG/1
1 TABLET TABLET, FILM COATED ORAL ONCE A DAY
Status: ACTIVE | COMMUNITY

## 2024-12-17 RX ORDER — CHOLECALCIFEROL (VITAMIN D3) 50 MCG
1 CAPSULE CAPSULE ORAL ONCE A DAY
Status: ACTIVE | COMMUNITY

## 2024-12-17 RX ORDER — USTEKINUMAB 130 MG/26ML
52 ML (INDUCTION DOSE) SOLUTION INTRAVENOUS
Qty: 2 | Refills: 0 | OUTPATIENT
Start: 2024-12-17 | End: 2024-12-18

## 2024-12-17 RX ORDER — ADALIMUMAB 40MG/0.4ML
INJECT 40 MG UNDER SKIN KIT SUBCUTANEOUS
Qty: 2 | Refills: 6 | Status: ACTIVE | COMMUNITY

## 2024-12-17 RX ORDER — BUDESONIDE 3 MG/1
3 CAPSULES CAPSULE ORAL ONCE A DAY
Qty: 90 CAPSULE | Refills: 3 | Status: ACTIVE | COMMUNITY
Start: 2024-04-24

## 2024-12-17 RX ORDER — HYOSCYAMINE SULFATE 0.12 MG/1
1 TABLET AS NEEDED TABLET ORAL
Status: ACTIVE | COMMUNITY

## 2024-12-17 RX ORDER — GLIPIZIDE 10 MG/1
1 TABLET 30 MINUTES BEFORE BREAKFAST TABLET ORAL ONCE A DAY
Status: ACTIVE | COMMUNITY

## 2024-12-17 RX ORDER — USTEKINUMAB 45 MG/.5ML
0.5 ML (MAINTENANCE DOSE) INJECTION, SOLUTION SUBCUTANEOUS
Qty: 2 | Refills: 5 | OUTPATIENT
Start: 2024-12-17 | End: 2026-06-13

## 2024-12-17 NOTE — HPI-TODAY'S VISIT:
79 year old female with a history of moderate to severe Ulcerative colitis started on Remicade in 2014 and did not achieve full clinical remission despite adding immunomodulators. Was on 5mg/kg every 8 weeks and changed to every 6 weeks in mid 2014 due to persistent diarrhea. Remicade changed to Humira 2015 after restaging colonoscopy revealed persistent colitis and adequate serum levels of drug. 6MP restarted with Apriso and still with symptoms; re-staging colonoscopy 10/2015 revealed active left sided colitis and microscopic disease in right colon. Pt treated with prednisone and later achieved clinical remission. Has needed IV iron in the past.  6 months ago she was having constant pain that she felt was severe recurrence of ulcerative colitis. Increased calprotectin 282.   Was hospitalized 1/2022 for acute cholecystitis and CMP and CBC wnl at that time; repeat CT 3 weeks later and post surgical changes and no colitis.  In recall for colon June 2025. Last Colon 6/2022 showed diverticulosis, grossly normal colonic mucosa, and path showed patchy mild chronic inactive colitis consistent with quiescent UC, internal hemorrhoids and a normal TI. When last seen 3/2023, she had been stable on Humira 40mg q 2 weeks; 50mg of 6MP. Anser testing and no Ab's and CBC and CMP wnl. Denied abdominal pain and bowel movements are 1-2 times daily of formed, non-bloody stool. GERD was well controlled on Esomeprazole. She called 4/2024 c/o colitis flare and Budesonide and Hyoscyamine were sent in.   7/18/24 for follow up: Labs 2/2824 showed an unremarkable CMP aside from elevated glucose and minimal ALT elevation of 40. CBC with no anemia (H&H slightly elevated at 16.3/47.9). She reports more recent blood work done by Dr. Vickers. She is seeing her for osteoporosis and bone loss. She continues to take Humira, but ran out of 6MP over 6 months ago, and can tell the difference. She was having frequent stools (5-6/day) when she had her flare, but now down to 2 formed stools/day since completing 2 months of Budesonide. She continues to have cramping lower abdominal pain which is relieved with Hyoscyamine. She has seen trace blood per rectum which she believes is from  her hemorrhoids; no bloody stool. She has lost 30 pounds since her last visit which she feels is due to the UC since off of 6MP. It was restarted and the severe pain is gone She still gets diarrhea. Averages 1 bowel movement a day, but will have diarrhea which she describes she sits playing her games and has an accident of loose stool.

## 2024-12-17 NOTE — PHYSICAL EXAM CONSTITUTIONAL:
elderly, chronically ill, well developed, well nourished , in no acute distress , ambulating with cane with difficulty, normal communication ability

## 2024-12-20 ENCOUNTER — TELEPHONE ENCOUNTER (OUTPATIENT)
Dept: URBAN - METROPOLITAN AREA CLINIC 44 | Facility: CLINIC | Age: 79
End: 2024-12-20

## 2024-12-23 ENCOUNTER — TELEPHONE ENCOUNTER (OUTPATIENT)
Dept: URBAN - METROPOLITAN AREA CLINIC 44 | Facility: CLINIC | Age: 79
End: 2024-12-23

## 2024-12-30 ENCOUNTER — ERX REFILL RESPONSE (OUTPATIENT)
Dept: URBAN - METROPOLITAN AREA CLINIC 44 | Facility: CLINIC | Age: 79
End: 2024-12-30

## 2024-12-30 RX ORDER — HYOSCYAMINE SULFATE 0.12 MG/1
1 TABLET AS NEEDED TABLET ORAL
OUTPATIENT

## 2024-12-30 RX ORDER — HYOSCYAMINE SULFATE 0.12 MG/1
1 TABLET AS NEEDED FOR ABDOMINAL PAIN TABLET ORAL THREE TIMES A DAY
Qty: 90 TABLET | Refills: 3 | OUTPATIENT

## 2025-02-18 ENCOUNTER — OFFICE VISIT (OUTPATIENT)
Dept: URBAN - METROPOLITAN AREA CLINIC 43 | Facility: CLINIC | Age: 80
End: 2025-02-18
Payer: COMMERCIAL

## 2025-02-18 VITALS
WEIGHT: 146.7 LBS | BODY MASS INDEX: 25.99 KG/M2 | TEMPERATURE: 98.2 F | RESPIRATION RATE: 19 BRPM | SYSTOLIC BLOOD PRESSURE: 93 MMHG | DIASTOLIC BLOOD PRESSURE: 69 MMHG | HEIGHT: 63 IN | HEART RATE: 75 BPM

## 2025-02-18 DIAGNOSIS — K51.00 ULCERATIVE PAN COLITIS: ICD-10-CM

## 2025-02-18 PROCEDURE — 96413 CHEMO IV INFUSION 1 HR: CPT | Performed by: INTERNAL MEDICINE

## 2025-02-18 RX ORDER — MERCAPTOPURINE 50 MG/1
AS DIRECTED TABLET ORAL ONCE A DAY
Qty: 90 | Refills: 3 | Status: ACTIVE | COMMUNITY
Start: 2024-07-18

## 2025-02-18 RX ORDER — ESOMEPRAZOLE MAGNESIUM 20 MG/1
1 CAPSULE CAPSULE, DELAYED RELEASE ORAL ONCE A DAY
Status: ACTIVE | COMMUNITY

## 2025-02-18 RX ORDER — EMPAGLIFLOZIN 25 MG/1
1 TABLET TABLET, FILM COATED ORAL ONCE A DAY
Status: ACTIVE | COMMUNITY

## 2025-02-18 RX ORDER — ROSUVASTATIN CALCIUM 10 MG/1
1 TABLET TABLET, COATED ORAL ONCE A DAY
Status: ACTIVE | COMMUNITY

## 2025-02-18 RX ORDER — BUDESONIDE 3 MG/1
3 CAPSULES CAPSULE ORAL ONCE A DAY
Qty: 90 CAPSULE | Refills: 3 | Status: ACTIVE | COMMUNITY
Start: 2024-04-24

## 2025-02-18 RX ORDER — SITAGLIPTIN 100 MG/1
1 TABLET TABLET, FILM COATED ORAL ONCE A DAY
Status: ACTIVE | COMMUNITY

## 2025-02-18 RX ORDER — HYOSCYAMINE SULFATE 0.12 MG/1
1 TABLET AS NEEDED FOR ABDOMINAL PAIN TABLET ORAL THREE TIMES A DAY
Qty: 90 TABLET | Refills: 3 | Status: ACTIVE | COMMUNITY

## 2025-02-18 RX ORDER — CHOLECALCIFEROL (VITAMIN D3) 50 MCG
1 CAPSULE CAPSULE ORAL ONCE A DAY
Status: ACTIVE | COMMUNITY

## 2025-02-18 RX ORDER — USTEKINUMAB 45 MG/.5ML
0.5 ML (MAINTENANCE DOSE) INJECTION, SOLUTION SUBCUTANEOUS
Qty: 2 | Refills: 6 | Status: ACTIVE | COMMUNITY
Start: 2024-12-17 | End: 2026-02-12

## 2025-02-18 RX ORDER — GLIPIZIDE 10 MG/1
1 TABLET 30 MINUTES BEFORE BREAKFAST TABLET ORAL ONCE A DAY
Status: ACTIVE | COMMUNITY

## 2025-02-18 RX ORDER — ADALIMUMAB 40MG/0.4ML
INJECT 40 MG UNDER SKIN KIT SUBCUTANEOUS
Qty: 2 | Refills: 6 | Status: ACTIVE | COMMUNITY

## 2025-02-19 ENCOUNTER — P2P PATIENT RECORD (OUTPATIENT)
Age: 80
End: 2025-02-19

## 2025-02-24 ENCOUNTER — TELEPHONE ENCOUNTER (OUTPATIENT)
Dept: URBAN - METROPOLITAN AREA CLINIC 44 | Facility: CLINIC | Age: 80
End: 2025-02-24

## 2025-07-23 ENCOUNTER — TELEPHONE ENCOUNTER (OUTPATIENT)
Dept: URBAN - METROPOLITAN AREA CLINIC 44 | Facility: CLINIC | Age: 80
End: 2025-07-23

## 2025-07-23 RX ORDER — HYOSCYAMINE SULFATE 0.12 MG/1
1 TABLET AS NEEDED FOR ABDOMINAL PAIN TABLET ORAL
Qty: 90 TABLET | Refills: 3

## 2025-08-21 ENCOUNTER — OFFICE VISIT (OUTPATIENT)
Dept: URBAN - METROPOLITAN AREA CLINIC 48 | Facility: CLINIC | Age: 80
End: 2025-08-21